# Patient Record
Sex: FEMALE | Race: WHITE | NOT HISPANIC OR LATINO | Employment: PART TIME | URBAN - METROPOLITAN AREA
[De-identification: names, ages, dates, MRNs, and addresses within clinical notes are randomized per-mention and may not be internally consistent; named-entity substitution may affect disease eponyms.]

---

## 2023-11-20 ENCOUNTER — TELEPHONE (OUTPATIENT)
Dept: PRIMARY CARE | Facility: CLINIC | Age: 53
End: 2023-11-20
Payer: COMMERCIAL

## 2023-11-20 NOTE — TELEPHONE ENCOUNTER
Rx Refill Request Telephone Encounter    Name:  Tamera Og  :  [id number]  Medication Name:  Sertraline 100 mg tablet   Dose :    Route :    Frequency :    Quantity :    Directions :    Specific Pharmacy location:  [facility] - On file   Date of last appointment:    Date of next appointment:  23  Best number to reach patient:

## 2023-11-21 RX ORDER — SERTRALINE HYDROCHLORIDE 100 MG/1
100 TABLET, FILM COATED ORAL DAILY
OUTPATIENT
Start: 2023-11-21

## 2023-11-21 RX ORDER — SERTRALINE HYDROCHLORIDE 100 MG/1
200 TABLET, FILM COATED ORAL DAILY
COMMUNITY
End: 2023-12-13 | Stop reason: SDUPTHER

## 2023-11-21 NOTE — TELEPHONE ENCOUNTER
Medication refused due to failing protocol.  Not seen since 2021.will prescribe after visit  Requested Prescriptions   Pending Prescriptions Disp Refills    sertraline (Zoloft) 100 mg tablet       Sig: Take 1 tablet (100 mg) by mouth once daily.       SSRI Protocol Failed - 11/21/2023  2:00 PM        Failed - Blood pressure on record in past 15 months     BP Readings from Last 3 Encounters:   07/14/21 110/80   10/28/20 120/80               Failed - Selective serotonin reuptake inhibitor not refilled in past 45 days (1.5 months)     Matching medication order placed on 11/21/2023  2:00 PM  Order [id number]: sertraline (Zoloft) 100 mg tablet  (For orders placed between 10/7/2023  2:06 PM and 11/21/2023  2:06 PM)              Failed - Depression screening on record in the past year        Passed - No active pregnancy on record        Passed - No pregnancy test in the past 12 months or most recent test was negative        Passed - Visit with relevant provider in past 12 months or upcoming 90 days     Recent Visits  No visits were found meeting these conditions.  Showing recent visits within past 365 days and meeting all other requirements  Future Appointments  Date Type Provider Dept   12/13/23 Appointment [id number]   Showing future appointments within next 90 days and meeting all other requirements              Passed - Serotonin modulator not refilled in the past 45 days (1.5 months)     No matching medication orders between 10/7/2023  2:06 PM and 11/21/2023  2:06 PM             Signed Prescriptions Disp Refills    sertraline (Zoloft) 100 mg tablet       Sig: Take 2 tablets (200 mg) by mouth once daily.       There is no refill protocol information for this order

## 2023-11-27 ENCOUNTER — APPOINTMENT (OUTPATIENT)
Dept: PRIMARY CARE | Facility: CLINIC | Age: 53
End: 2023-11-27
Payer: COMMERCIAL

## 2023-12-13 ENCOUNTER — OFFICE VISIT (OUTPATIENT)
Dept: PRIMARY CARE | Facility: CLINIC | Age: 53
End: 2023-12-13
Payer: COMMERCIAL

## 2023-12-13 VITALS
DIASTOLIC BLOOD PRESSURE: 72 MMHG | OXYGEN SATURATION: 97 % | SYSTOLIC BLOOD PRESSURE: 119 MMHG | WEIGHT: 152.2 LBS | HEIGHT: 66 IN | HEART RATE: 79 BPM | TEMPERATURE: 96.9 F | BODY MASS INDEX: 24.46 KG/M2

## 2023-12-13 DIAGNOSIS — F41.8 DEPRESSION WITH ANXIETY: Primary | ICD-10-CM

## 2023-12-13 DIAGNOSIS — E78.2 MIXED HYPERLIPIDEMIA: ICD-10-CM

## 2023-12-13 PROCEDURE — 1036F TOBACCO NON-USER: CPT | Performed by: INTERNAL MEDICINE

## 2023-12-13 PROCEDURE — 99213 OFFICE O/P EST LOW 20 MIN: CPT | Performed by: INTERNAL MEDICINE

## 2023-12-13 RX ORDER — SERTRALINE HYDROCHLORIDE 100 MG/1
TABLET, FILM COATED ORAL
Qty: 135 TABLET | Refills: 1 | Status: SHIPPED | OUTPATIENT
Start: 2023-12-13

## 2023-12-13 ASSESSMENT — ENCOUNTER SYMPTOMS
NERVOUS/ANXIOUS: 0
CONSTIPATION: 0
BLOOD IN STOOL: 0
COUGH: 0
WEAKNESS: 0
UNEXPECTED WEIGHT CHANGE: 0
DIZZINESS: 0
DIARRHEA: 0
PALPITATIONS: 0
SHORTNESS OF BREATH: 0
AGITATION: 0
HALLUCINATIONS: 0
ACTIVITY CHANGE: 0
ANAL BLEEDING: 0
ABDOMINAL DISTENTION: 0
CHEST TIGHTNESS: 0

## 2023-12-13 ASSESSMENT — PATIENT HEALTH QUESTIONNAIRE - PHQ9
SUM OF ALL RESPONSES TO PHQ9 QUESTIONS 1 AND 2: 0
1. LITTLE INTEREST OR PLEASURE IN DOING THINGS: NOT AT ALL
2. FEELING DOWN, DEPRESSED OR HOPELESS: NOT AT ALL
2. FEELING DOWN, DEPRESSED OR HOPELESS: NOT AT ALL
1. LITTLE INTEREST OR PLEASURE IN DOING THINGS: NOT AT ALL
SUM OF ALL RESPONSES TO PHQ9 QUESTIONS 1 AND 2: 0

## 2023-12-13 NOTE — PROGRESS NOTES
"Subjective   Patient ID: Tamera gO is a 53 y.o. female who presents for Follow-up (Medications).    HPI   has been taking sertraline through telehealth provider.  Currently started working again and has insurance.  Tried 200 mg of sertraline which did not make a difference.  Taking 158 which controls all her symptoms  Not ready to stop  Has been on it for a while  Watching diet and exercising next plan will schedule Pap and checkup later      Review of Systems   Constitutional:  Negative for activity change and unexpected weight change.   HENT:  Negative for congestion.    Respiratory:  Negative for cough, chest tightness and shortness of breath.    Cardiovascular:  Negative for palpitations and leg swelling.   Gastrointestinal:  Negative for abdominal distention, anal bleeding, blood in stool, constipation and diarrhea.   Neurological:  Negative for dizziness and weakness.   Psychiatric/Behavioral:  Negative for agitation and hallucinations. The patient is not nervous/anxious.        Objective   /72   Pulse 79   Temp 36.1 °C (96.9 °F)   Ht 1.676 m (5' 6\")   Wt 69 kg (152 lb 3.2 oz)   SpO2 97%   BMI 24.57 kg/m²     Physical Exam    Constitutional:       Appearance: Normal appearance.   HENT:      Head: Normocephalic.   Cardiovascular:      Rate and Rhythm: Normal rate and regular rhythm.   Pulmonary:      Effort: Pulmonary effort is normal.      Breath sounds: Normal breath sounds.   Abdominal:      General: Abdomen is flat. There is no distension.      Palpations: There is no mass.      Tenderness: There is no abdominal tenderness. There is no guarding or rebound.   Musculoskeletal:      Cervical back: Neck supple.   Neurological:      Mental Status: She is alert.    Assessment/Plan   Problem List Items Addressed This Visit             ICD-10-CM    Depression with anxiety - Primary F41.8     Symptoms are controlled on treatment.  Continue same         Relevant Medications    sertraline (Zoloft) 100 " mg tablet    Mixed hyperlipidemia E78.2     Continue healthy diet and exercise.will do labs at follow up          Schedule Physical to update preventive care.

## 2025-04-23 ENCOUNTER — HOSPITAL ENCOUNTER (OUTPATIENT)
Dept: RADIOLOGY | Facility: CLINIC | Age: 55
Discharge: HOME | End: 2025-04-23
Payer: COMMERCIAL

## 2025-04-23 DIAGNOSIS — Z12.31 ENCOUNTER FOR SCREENING MAMMOGRAM FOR MALIGNANT NEOPLASM OF BREAST: ICD-10-CM

## 2025-04-23 PROCEDURE — 77067 SCR MAMMO BI INCL CAD: CPT | Performed by: RADIOLOGY

## 2025-04-23 PROCEDURE — 77063 BREAST TOMOSYNTHESIS BI: CPT | Performed by: RADIOLOGY

## 2025-04-23 PROCEDURE — 77067 SCR MAMMO BI INCL CAD: CPT
